# Patient Record
Sex: FEMALE | Race: WHITE | Employment: UNEMPLOYED | URBAN - METROPOLITAN AREA
[De-identification: names, ages, dates, MRNs, and addresses within clinical notes are randomized per-mention and may not be internally consistent; named-entity substitution may affect disease eponyms.]

---

## 2017-06-20 ENCOUNTER — TELEPHONE (OUTPATIENT)
Dept: INTERNAL MEDICINE | Age: 32
End: 2017-06-20

## 2017-06-20 RX ORDER — CIPROFLOXACIN 500 MG/1
500 TABLET, FILM COATED ORAL 2 TIMES DAILY
Qty: 14 TABLET | Refills: 0 | Status: SHIPPED | OUTPATIENT
Start: 2017-06-20 | End: 2018-03-21 | Stop reason: ALTCHOICE

## 2017-06-21 ENCOUNTER — TELEPHONE (OUTPATIENT)
Dept: INTERNAL MEDICINE | Age: 32
End: 2017-06-21

## 2017-06-21 ENCOUNTER — OFFICE VISIT (OUTPATIENT)
Dept: INTERNAL MEDICINE | Age: 32
End: 2017-06-21

## 2017-06-21 VITALS
BODY MASS INDEX: 26.63 KG/M2 | WEIGHT: 156 LBS | TEMPERATURE: 97.6 F | DIASTOLIC BLOOD PRESSURE: 80 MMHG | HEIGHT: 64 IN | SYSTOLIC BLOOD PRESSURE: 110 MMHG

## 2017-06-21 DIAGNOSIS — R19.7 DIARRHEA OF PRESUMED INFECTIOUS ORIGIN: Primary | ICD-10-CM

## 2017-06-21 DIAGNOSIS — R19.7 DIARRHEA OF PRESUMED INFECTIOUS ORIGIN: ICD-10-CM

## 2017-06-21 LAB
REASON FOR REJECTION: NORMAL
REJECTED TEST: NORMAL

## 2017-06-21 PROCEDURE — 99213 OFFICE O/P EST LOW 20 MIN: CPT | Performed by: INTERNAL MEDICINE

## 2017-06-21 RX ORDER — METRONIDAZOLE 500 MG/1
500 TABLET ORAL 3 TIMES DAILY
Qty: 30 TABLET | Refills: 0 | Status: SHIPPED | OUTPATIENT
Start: 2017-06-21 | End: 2017-07-01

## 2017-06-21 RX ORDER — ONDANSETRON 4 MG/1
4 TABLET, FILM COATED ORAL EVERY 8 HOURS PRN
Qty: 30 TABLET | Refills: 2 | Status: SHIPPED | OUTPATIENT
Start: 2017-06-21 | End: 2018-03-21 | Stop reason: ALTCHOICE

## 2017-06-21 ASSESSMENT — ENCOUNTER SYMPTOMS
COUGH: 0
WHEEZING: 0
BLOOD IN STOOL: 0
DIARRHEA: 1
ABDOMINAL PAIN: 1
ABDOMINAL DISTENTION: 1

## 2017-06-25 LAB
FINAL REPORT: NORMAL
PRELIMINARY: NORMAL

## 2017-06-26 LAB
FINAL REPORT: NORMAL
PRELIMINARY: NORMAL

## 2018-03-20 ENCOUNTER — OFFICE VISIT (OUTPATIENT)
Dept: INTERNAL MEDICINE | Age: 33
End: 2018-03-20

## 2018-03-20 VITALS
DIASTOLIC BLOOD PRESSURE: 70 MMHG | WEIGHT: 151 LBS | HEIGHT: 64 IN | BODY MASS INDEX: 25.78 KG/M2 | SYSTOLIC BLOOD PRESSURE: 118 MMHG

## 2018-03-20 DIAGNOSIS — N92.6 MENSTRUAL IRREGULARITY: ICD-10-CM

## 2018-03-20 DIAGNOSIS — F51.01 PRIMARY INSOMNIA: ICD-10-CM

## 2018-03-20 DIAGNOSIS — R10.2 PELVIC PAIN IN FEMALE: ICD-10-CM

## 2018-03-20 DIAGNOSIS — R53.83 FATIGUE, UNSPECIFIED TYPE: ICD-10-CM

## 2018-03-20 DIAGNOSIS — R35.0 URINARY FREQUENCY: Primary | ICD-10-CM

## 2018-03-20 LAB
BILIRUBIN, POC: NORMAL
BLOOD URINE, POC: NORMAL
CLARITY, POC: CLEAR
COLOR, POC: YELLOW
CONTROL: NEGATIVE
GLUCOSE URINE, POC: NORMAL
KETONES, POC: NORMAL
LEUKOCYTE EST, POC: NORMAL
NITRITE, POC: NORMAL
PH, POC: 7
PREGNANCY TEST URINE, POC: NEGATIVE
PROTEIN, POC: NORMAL
SPECIFIC GRAVITY, POC: 1
UROBILINOGEN, POC: 0.2

## 2018-03-20 PROCEDURE — 81002 URINALYSIS NONAUTO W/O SCOPE: CPT | Performed by: INTERNAL MEDICINE

## 2018-03-20 PROCEDURE — 81025 URINE PREGNANCY TEST: CPT | Performed by: INTERNAL MEDICINE

## 2018-03-20 PROCEDURE — 99214 OFFICE O/P EST MOD 30 MIN: CPT | Performed by: INTERNAL MEDICINE

## 2018-03-20 NOTE — PROGRESS NOTES
Subjective:      Patient ID: Aydee Ortega is a 29 y.o. female. 2 weeks ago had urgency and pressure- then had recurrence this past Thursday- had severe dysurea and felt she had to squeeze to get the urine out- keeping her up at night- not on her menses but symptoms did start when she was on it- no fever- some low back pain but no flank pain- worse when lying down- not seen by gyn for awhile and doesn't have one now - feels like she has to use a lot of pressure to get the urine out-no constipation=-no pain w defication- no real pain w urination but painful due to squeeze     Review of Systems   Constitutional: Positive for fatigue. Negative for activity change, appetite change and fever. HENT: Negative for congestion. Eyes: Negative for visual disturbance. Respiratory: Negative for chest tightness and wheezing. Cardiovascular: Negative for chest pain and palpitations. Gastrointestinal: Negative for abdominal pain. Genitourinary: Positive for decreased urine volume, difficulty urinating, dysuria, frequency, pelvic pain and urgency. Negative for flank pain, genital sores and hematuria. Musculoskeletal: Negative for arthralgias and back pain. Skin: Negative for color change and rash. Neurological: Negative for weakness, light-headedness and headaches. Psychiatric/Behavioral: Positive for sleep disturbance. Negative for dysphoric mood. The patient is not nervous/anxious. Objective:   Physical Exam   Constitutional: She is oriented to person, place, and time. She appears well-developed and well-nourished. She is cooperative. No distress. HENT:   Head: Normocephalic and atraumatic. Right Ear: Tympanic membrane, external ear and ear canal normal.   Left Ear: Tympanic membrane, external ear and ear canal normal.   Mouth/Throat: Oropharynx is clear and moist. No oropharyngeal exudate. Eyes: Conjunctivae, EOM and lids are normal. Pupils are equal, round, and reactive to light.  Right eye exhibits no discharge. Left eye exhibits no discharge. No scleral icterus. Neck: Normal range of motion. Neck supple. No JVD present. Carotid bruit is not present. No tracheal deviation and no edema present. No thyroid mass and no thyromegaly present. Cardiovascular: Normal rate, regular rhythm, S1 normal, S2 normal, normal heart sounds and intact distal pulses. Exam reveals no gallop and no friction rub. No murmur heard. No carotid bruit noted bilaterally   Pulmonary/Chest: Effort normal and breath sounds normal. No respiratory distress. She has no wheezes. She has no rhonchi. She has no rales. She exhibits no mass and no tenderness. Right breast exhibits no mass. Left breast exhibits no mass. Breasts are symmetrical.   Abdominal: Soft. Bowel sounds are normal. She exhibits no mass. There is no splenomegaly or hepatomegaly. There is no tenderness. There is no rebound. Genitourinary: Vagina normal and uterus normal. Rectal exam shows no tenderness. No breast swelling, tenderness or discharge. Cervix exhibits no motion tenderness. Right adnexum displays no mass and no tenderness. Left adnexum displays no mass and no tenderness. Genitourinary Comments: No obvious rectocoel or cystocoel  No sig vagial discharge- some tenderness w rotation of cervix  ++ suprapubic tenderness   Musculoskeletal: Normal range of motion. She exhibits no edema or tenderness. Lymphadenopathy:     She has no cervical adenopathy. Right: No inguinal adenopathy present. Left: No inguinal adenopathy present. Neurological: She is alert and oriented to person, place, and time. She has normal strength and normal reflexes. No cranial nerve deficit. Skin: Skin is warm and intact. No rash noted. She is not diaphoretic. Psychiatric: She has a normal mood and affect.  Her speech is normal and behavior is normal. Judgment normal.         Assessment and Plan:      Urinary frequency  No obvious uti but will ck cx-?? Due to

## 2018-03-22 LAB — URINE CULTURE, ROUTINE: NORMAL

## 2018-03-23 LAB
C TRACH DNA GENITAL QL NAA+PROBE: NEGATIVE
N. GONORRHOEAE DNA: NEGATIVE

## 2018-03-27 PROBLEM — R10.2 PELVIC PAIN IN FEMALE: Status: ACTIVE | Noted: 2018-03-27

## 2018-03-27 PROBLEM — F51.01 PRIMARY INSOMNIA: Status: ACTIVE | Noted: 2018-03-27

## 2018-03-27 ASSESSMENT — ENCOUNTER SYMPTOMS
BACK PAIN: 0
CHEST TIGHTNESS: 0
WHEEZING: 0
ABDOMINAL PAIN: 0
COLOR CHANGE: 0

## 2018-04-11 ENCOUNTER — HOSPITAL ENCOUNTER (OUTPATIENT)
Dept: PHYSICAL THERAPY | Age: 33
Discharge: HOME | End: 2018-04-30
Attending: UROLOGY | Admitting: UROLOGY

## 2018-04-11 ASSESSMENT — PAIN DESCRIPTION - PAIN TYPE: TYPE: CHRONIC PAIN

## 2018-04-11 ASSESSMENT — PAIN DESCRIPTION - LOCATION: LOCATION: ABDOMEN;BACK;PELVIS

## 2018-04-11 ASSESSMENT — PAIN SCALES - GENERAL: PAINLEVEL_OUTOF10: 4

## 2018-05-01 ENCOUNTER — HOSPITAL ENCOUNTER (OUTPATIENT)
Dept: OTHER | Age: 33
Discharge: HOME | End: 2018-05-31
Attending: UROLOGY | Admitting: UROLOGY

## 2018-05-08 ENCOUNTER — TELEPHONE (OUTPATIENT)
Dept: INTERNAL MEDICINE | Age: 33
End: 2018-05-08

## 2018-07-11 ENCOUNTER — OFFICE VISIT (OUTPATIENT)
Dept: ORTHOPEDIC SURGERY | Age: 33
End: 2018-07-11

## 2018-07-11 VITALS
HEART RATE: 107 BPM | SYSTOLIC BLOOD PRESSURE: 116 MMHG | DIASTOLIC BLOOD PRESSURE: 83 MMHG | HEIGHT: 64 IN | WEIGHT: 145 LBS | BODY MASS INDEX: 24.75 KG/M2

## 2018-07-11 DIAGNOSIS — M25.551 RIGHT HIP PAIN: Primary | ICD-10-CM

## 2018-07-11 DIAGNOSIS — M25.552 LEFT HIP PAIN: ICD-10-CM

## 2018-07-11 PROCEDURE — 99203 OFFICE O/P NEW LOW 30 MIN: CPT | Performed by: ORTHOPAEDIC SURGERY

## 2018-07-11 RX ORDER — NAPROXEN 250 MG/1
200 TABLET ORAL PRN
COMMUNITY

## 2018-07-11 RX ORDER — GABAPENTIN 100 MG/1
100 CAPSULE ORAL 3 TIMES DAILY
Qty: 90 CAPSULE | Refills: 3 | Status: SHIPPED | OUTPATIENT
Start: 2018-07-11 | End: 2018-08-10

## 2018-07-11 RX ORDER — OXYBUTYNIN CHLORIDE 5 MG/1
10 TABLET ORAL 3 TIMES DAILY
COMMUNITY
End: 2018-10-11 | Stop reason: ALTCHOICE

## 2018-07-11 RX ORDER — ATROPA BELLADONNA AND OPIUM 16.2; 3 MG/1; MG/1
30 SUPPOSITORY RECTAL EVERY 8 HOURS PRN
COMMUNITY

## 2018-07-12 NOTE — PROGRESS NOTES
Patient Name: Alyssa Sarah  : 1985  DOS: 2018        Chief Complaint:   Chief Complaint   Patient presents with   [facility] Patient     bilateral hips more pain on Lt side of the Hip       History of Present Illness:  Alyssa Sarah is a 34 y.o. female who presents with a chief complaint of Pain and numbness in the legs. She's had a complicated history of urinary problems which been evaluated by the urologist without absolute diagnosis. She is continuing to have pain in the perineal area as well as numbness in this region. She is a previous runner which she is discontinued over the last several months. Initially running resulted in bilateral lower extremity numbness. Subsequently symptoms have been intermittent when she's been walking for longer distances. She is just walking the dogs currently with some complaints of pain thigh with radiating discomfort along the upper thigh numbness in the anterolateral thigh and occasional numbness in the dorsal aspect of her foot on the left    She's being evaluated by neurology as well MRI has been negative EMG has shown some mild amplitude variance but no signs of any discrete abnormality  She is not indicating any specific trauma to the groin or left hip or leg. Medical History  Current Medications:   Prior to Admission medications    Medication Sig Start Date End Date Taking? Authorizing Provider   oxybutynin (DITROPAN) 5 MG tablet Take 10 mg by mouth 3 times daily   Yes Historical Provider, MD   naproxen (NAPROSYN) 250 MG tablet Take 200 mg by mouth as needed for Pain   Yes Historical Provider, MD   opium-belladonna (B&O SUPPRETTES) 16.2-30 MG suppository Place 30 mg rectally every 8 hours as needed for Pain. .   Yes Historical Provider, MD   gabapentin (NEURONTIN) 100 MG capsule Take 1 capsule by mouth 3 times daily for 30 days. . 7/11/18 8/10/18 Yes Kasey Duncan MD   Multiple Vitamin (MULTI-VITAMIN DAILY PO) Take  by mouth.    Yes Historical show any obvious abnormalities to the hips    on 2 view of the hip   Procedure(s):       Assessment and Plan:  1. Right hip pain    2. Left hip pain        No Follow-up on file. The orders below, if any, were placed during this visit:   Orders Placed This Encounter   Procedures    XR HIP BILATERAL W AP PELVIS (2 VIEWS)     Order Specific Question:   Reason for exam:     Answer:   Pain RM 4    Ambulatory referral to Physical Therapy     Referral Priority:   Routine     Referral Type:   Eval and Treat     Requested Specialty:   Physical Therapy     Number of Visits Requested:   1       Impression:   [unfilled]    Treatment Plan: Recommendations are to try her on some gabapentin to see if this decreases some of her perineal pain. The possibility of this being pudendal and origin was discussed although etiology is unclear. I was evaluated and grossly she has no signs of obvious disc or root problems. No obvious masses identified. Additional neurology inputs are pending. He does not exhibit any ocular signs nor any intermittent neurologic deficits. No family history of multiple sclerosis. Physical therapy may benefit her maintenance exercise regimen as well as to decrease her IT band pain. We will see her back in another 4-6 weeks to see if therapy and/or medications have made any effect.        Flex Ureña MD

## 2018-07-30 ENCOUNTER — HOSPITAL ENCOUNTER (OUTPATIENT)
Dept: PHYSICAL THERAPY | Age: 33
Setting detail: THERAPIES SERIES
Discharge: HOME | End: 2018-07-30
Payer: COMMERCIAL

## 2018-07-30 PROCEDURE — 97163 PT EVAL HIGH COMPLEX 45 MIN: CPT

## 2018-07-30 PROCEDURE — 97530 THERAPEUTIC ACTIVITIES: CPT

## 2018-07-30 NOTE — FLOWSHEET NOTE
Patient will reduce urinary frequency by 25% to decrease bladder spasm and improve quality of life. 3. Patient will reduce night voids by 50% to improve quality and quantity of sleep. 4. Patient will improve PFDI score by at least 10 points demonstrating improved PF function and quality of life.     Long Term Goals: 8-12 weeks  1. Patient will reduce urinary frequency by 50% to decrease bladder spasm and improve quality of life. 2. Patient will reduce night voids by 75% to improve quality and quantity of sleep. 3. Patient will improve PFDI score by at least 15 points demonstrating improved PF function and quality of life.           Plan: [x] Continue per plan of care [] Alter current plan (see comments)   [] Plan of care initiated [] Hold pending MD visit [] Discharge      Plan for Next Session:  Review diary    Re-Certification Due Date:    8/30/18     Signature:  Donya Mcrae PT, DPT

## 2018-07-30 NOTE — PLAN OF CARE
Patient will reduce night voids by 50% to improve quality and quantity of sleep. 4. Patient will improve PFDI score by at least 10 points demonstrating improved PF function and quality of life. Long Term Goal  1. Patient will reduce urinary frequency by 50% to decrease bladder spasm and improve quality of life. 2. Patient will reduce night voids by 75% to improve quality and quantity of sleep. 3. Patient will improve PFDI score by at least 15 points demonstrating improved PF function and quality of life. Plan  Patient will be seen 1-2x/week for 8-12weeks. Rx to consist of: Home management, NMred, manual, modalities PRN, TA, TE    Time IN/OUT: 8:00am-9:30am      Kevin Jacobs PT, DPT    ______________________________________________________________________    If you have any questions or concerns, please don't hesitate to call.   Thank you for your referral.    Physician Signature:________________________________Date:__________________  By signing above, therapists plan is approved by physician

## 2018-07-31 ENCOUNTER — OFFICE VISIT (OUTPATIENT)
Dept: RHEUMATOLOGY | Age: 33
End: 2018-07-31

## 2018-07-31 VITALS
TEMPERATURE: 99.5 F | HEIGHT: 64 IN | WEIGHT: 151 LBS | DIASTOLIC BLOOD PRESSURE: 68 MMHG | SYSTOLIC BLOOD PRESSURE: 110 MMHG | BODY MASS INDEX: 25.78 KG/M2

## 2018-07-31 DIAGNOSIS — M62.81 MUSCLE WEAKNESS: ICD-10-CM

## 2018-07-31 DIAGNOSIS — Z13.89 SCREENING FOR RHEUMATIC DISORDER: Primary | ICD-10-CM

## 2018-07-31 PROCEDURE — 99244 OFF/OP CNSLTJ NEW/EST MOD 40: CPT | Performed by: INTERNAL MEDICINE

## 2018-07-31 NOTE — PROGRESS NOTES
plan.  I reviewed patient's history, referral documents and electronic medical records including orthopedic note, as well as neurology note from [facility]. Copy of consult note is being routed electronically/faxed to referring physician. #######################################################################    REASON FOR CONSULTATION:  Patient is being seen at the request of  Mal Jackson MD for evaluation of muscle weakness and urological symptoms. HISTORY OF PRESENT ILLNESS:  34 y.o. female states that she has had urinary symptoms since February this year which include frequency, urgency, hesitancy-at times she goes to bathroom 30-40 times a day, wakes up many times at night. Symptoms were worse in February, got better, again became symptomatic since earlier this month. Apart from her urinary symptoms, she reports muscle weakness mainly in her lower extremities, lasts for 20-30 minutes to a few hours, at times associated with transient numbness and tingling, and her symptoms are self limiting without any intervention. She states that her feet feel warm and cold, sometimes red. Denies symptoms that would suggest Raynaud's phenomenon. She has had a number of episodes since February this year. She does not have persistent symptoms. She is asymptomatic today. She was evaluated by neurology-MRI thoracic spine-unremarkable. She also had cystoscopy without any revealing finding. She was evaluated by orthopedics, does not have hip arthritis or SI joint pathology. She is very worried about her symptoms. She is referred here for evaluation of any autoimmune diseases. Pertinent ROS: Denies weight loss, objective fever, skin rashes or skin thickening, photosensitivity Raynauds, focal alopecia, recurrent ocular congestion, dry eyes or mouth, or mucosal ulcers, tinnitus or recent hearing loss. Denies chest pain, palpitations, cough, pleurisy, dysphagia, or features of inflammatory bowel diseases.   No h/o blood clots or bleeding disorders. . No focal weakness or persistent paresthesia. All other ROS are negative. Past Medical History:   Diagnosis Date    History of cardiac cath 2015    SVT (supraventricular tachycardia) (HCC)      Past Surgical History:   Procedure Laterality Date    ABLATION OF DYSRHYTHMIC FOCUS  2013    RHINOPLASTY      WISDOM TOOTH EXTRACTION       No family history of autoimmune diseases  I now  Current Outpatient Prescriptions   Medication Sig Dispense Refill    oxybutynin (DITROPAN) 5 MG tablet Take 10 mg by mouth 3 times daily      naproxen (NAPROSYN) 250 MG tablet Take 200 mg by mouth as needed for Pain      opium-belladonna (B&O SUPPRETTES) 16.2-30 MG suppository Place 30 mg rectally every 8 hours as needed for Pain. Dinh Seamen gabapentin (NEURONTIN) 100 MG capsule Take 1 capsule by mouth 3 times daily for 30 days. . 90 capsule 3    Multiple Vitamin (MULTI-VITAMIN DAILY PO) Take  by mouth.  fluticasone (FLONASE) 50 MCG/ACT SUSP 1 spray by Nasal route daily 1 Bottle 0     No current facility-administered medications for this visit. No Known Allergies    PHYSICAL EXAM:    Vitals:    /68   Temp 99.5 °F (37.5 °C) (Oral)   Ht 5' 4\" (1.626 m)   Wt 151 lb (68.5 kg)   BMI 25.92 kg/m²   General appearance/ Psychiatric: well nourished, and well groomed, normal judgement, alert, appears stated age and cooperative. MKS: Completely unremarkable musculoskeletal examination in upper, lower extremities as well as spine. Normal gait, muscle strength in upper and lower extremities. She is able to squat, stand up without assistance or concerns. Muscle strength is preserved in upper and lower extremities, proximally and distally bilaterally. Skin: No rashes, no induration or skin thickening or nodules. No evidence ischemia or deformities noted in digits or nails. HEENT: Normal lids, lacrimal glands and pupils. No oral or nasal ulcers.  Salivary glands reveal no evidence of abnormality. External inspection of the ears and nose within normal limits. Neck: No masses or asymmetry. No thyroid enlargement. Chest: Normal effort, clear to auscultation. Heart:  Normal s1/s2, no leg edema. Abdomen: soft, non-tender. Liver no palpable. Neurologic: normal deep tendon reflexes. No foot or wrist drop. DATA:   Lab Results   Component Value Date    WBC 7.1 03/21/2018    HGB 14.1 03/21/2018    HCT 41.1 03/21/2018    MCV 92.1 03/21/2018     03/21/2018         Chemistry        Component Value Date/Time     03/21/2018 0253    K 4.2 03/21/2018 0253     03/21/2018 0253    CO2 27 03/21/2018 0253    BUN 11 03/21/2018 0253    CREATININE 0.7 03/21/2018 0253        Component Value Date/Time    CALCIUM 9.3 03/21/2018 0253    ALKPHOS 44 03/21/2018 0253    AST 13 (L) 03/21/2018 0253    ALT 10 03/21/2018 0253    BILITOT 0.3 03/21/2018 0253           Lab Results   Component Value Date    SEDRATE 7 07/25/2018     Lab Results   Component Value Date    CRP <0.3 07/25/2018     Lab Results   Component Value Date    MARK Negative 01/04/2014    SEDRATE 7 07/25/2018     Lab Results   Component Value Date    CKTOTAL 105 05/27/2016     Lab Results   Component Value Date    TSH 2.17 03/05/2016          A/P- See above.

## 2018-08-09 NOTE — PROGRESS NOTES
Physical Therapy    [facility] [place] - No Co Pay. No pre cert. 40 visits per calendar year. 39 visits remaining to date.

## 2018-08-14 ENCOUNTER — HOSPITAL ENCOUNTER (OUTPATIENT)
Dept: PHYSICAL THERAPY | Age: 33
Setting detail: THERAPIES SERIES
Discharge: HOME | End: 2018-08-14
Payer: COMMERCIAL

## 2018-08-14 ENCOUNTER — APPOINTMENT (OUTPATIENT)
Dept: PHYSICAL THERAPY | Age: 33
End: 2018-08-14
Payer: COMMERCIAL

## 2018-08-14 PROCEDURE — 97530 THERAPEUTIC ACTIVITIES: CPT

## 2018-08-16 ENCOUNTER — HOSPITAL ENCOUNTER (OUTPATIENT)
Dept: PHYSICAL THERAPY | Age: 33
Setting detail: THERAPIES SERIES
Discharge: HOME | End: 2018-08-16
Payer: COMMERCIAL

## 2018-08-16 PROCEDURE — 97530 THERAPEUTIC ACTIVITIES: CPT

## 2018-08-16 NOTE — FLOWSHEET NOTE
desire: 378mL   Max: 439ml   PVR: 425mL            ASSESSMENT:   The patient continues with frequent voids and noturia that is pretty severe. Significant amount of time spent analyzing urodynamic test results. The patient's treatment over the next 2 weeks will focus on \"neurogenic bladder\" symptoms and techniques in effort to increase void volume and reduce number of voids per day and night. The patient demonstrated good understanding of all material covered. No pain at end of PT session. Patient did not use bathroom during today's appointment time. Treatment/Activity Tolerance:   [x] Patient tolerated treatment well [] Patient limited by fatique  [] Patient limited by pain [] Patient limited by other medical complications  [] Other:     Goals:    Time Frame: 8-12 weeks. Rehab Potential: good     Short Term Goals: 4-8 weeks  1. Patient will demonstrate independence with HEP to self-manage symptoms. 2. Patient will reduce urinary frequency by 25% to decrease bladder spasm and improve quality of life. 3. Patient will reduce night voids by 50% to improve quality and quantity of sleep. 4. Patient will improve PFDI score by at least 10 points demonstrating improved PF function and quality of life.     Long Term Goals: 8-12 weeks  1. Patient will reduce urinary frequency by 50% to decrease bladder spasm and improve quality of life. 2. Patient will reduce night voids by 75% to improve quality and quantity of sleep. 3. Patient will improve PFDI score by at least 15 points demonstrating improved PF function and quality of life.           Plan: [x] Continue per plan of care [] Alter current plan (see comments)   [] Plan of care initiated [] Hold pending MD visit [] Discharge      Plan for Next Session:  Review diary    Re-Certification Due Date:    8/30/18     Signature:  Medhat Robison PT, DPT

## 2018-08-21 ENCOUNTER — HOSPITAL ENCOUNTER (OUTPATIENT)
Dept: PHYSICAL THERAPY | Age: 33
Setting detail: THERAPIES SERIES
Discharge: HOME | End: 2018-08-21
Payer: COMMERCIAL

## 2018-08-21 PROCEDURE — G0283 ELEC STIM OTHER THAN WOUND: HCPCS

## 2018-08-21 PROCEDURE — 97530 THERAPEUTIC ACTIVITIES: CPT

## 2018-08-21 NOTE — FLOWSHEET NOTE
Physical Therapy Daily Treatment Note    Date:  2018    Patient Name:  Trent Pearl    :  1985  MRN: [id number]  Restrictions/Precautions: universal    Medical/Treatment Diagnosis Information:  · Diagnosis: pelvic floor dysfunciton  Insurance/Certification information:  PT Insurance Information: [employer] [place]  Physician Information:  Referring Practitioner: Nicky Her MD   Plan of care signed (Y/N): N  Visit# / total visits:       G-Code (if applicable):          PFDI: 116.7/300    Medicare Cap (if applicable):  N/a = total amount used, updated 2018    Time in:   8:38a      Timed Treatment: 55min Total Treatment Time:  55min.  ________________________________________________________________________________________    Pain Level:    0/10  SUBJECTIVE:  Reports \"I have been doing better about distracting myself and not going to the bathroom, but night still is a problem\". \"I think for me I am doing better, I am still going in the low 20s per day\". \"as soon as I lay down to go to sleep it is like there is alarm bells going off telling me to pee\". \"during the day it isn't terrible, but at night it is bad, I can't delay\". \"I still am waking up from sleep to have to pee\". \"I am not having any bladder pain or anything\". States she has been using the bladder facilitation techniques, \"I mean it does help to relax a little bit more\". Reports nothing new to report.      OBJECTIVE:     Exercise (TE) Resistance/Repetitions Other comments                          PF relaxation                                   Other Treatment:   TA:  Bladder re-training/education: 45min    Bladder Diary: ~20 (was 9-38) day voids, 8 (was 6-26) night voids, 2-7 leaks per day upon standing from toilet    Bladder Re-training: REVIEW Patient advised to utilize distraction to lengthen time between voids and to perform PF contraction following voids in effort to reduce post-void leakage      Dietary: REVIEW: \"4Cs\" to life.     Long Term Goals: 8-12 weeks  1. Patient will reduce urinary frequency by 50% to decrease bladder spasm and improve quality of life. 2. Patient will reduce night voids by 75% to improve quality and quantity of sleep. 3. Patient will improve PFDI score by at least 15 points demonstrating improved PF function and quality of life.           Plan: [x] Continue per plan of care [] Alter current plan (see comments)   [] Plan of care initiated [] Hold pending MD visit [] Discharge      Plan for Next Session:  Review diary    Re-Certification Due Date:    8/30/18     Signature:  Tennie Epley, PT, DPT

## 2018-08-23 ENCOUNTER — HOSPITAL ENCOUNTER (OUTPATIENT)
Dept: PHYSICAL THERAPY | Age: 33
Setting detail: THERAPIES SERIES
Discharge: HOME | End: 2018-08-23
Payer: COMMERCIAL

## 2018-08-23 PROCEDURE — G0283 ELEC STIM OTHER THAN WOUND: HCPCS

## 2018-08-23 PROCEDURE — 97140 MANUAL THERAPY 1/> REGIONS: CPT

## 2018-08-23 PROCEDURE — 97530 THERAPEUTIC ACTIVITIES: CPT

## 2018-08-23 NOTE — FLOWSHEET NOTE
Physical Therapy Daily Treatment Note    Date:  2018    Patient Name:  Marla Shanks    :  1985  MRN: [id number]  Restrictions/Precautions: universal    Medical/Treatment Diagnosis Information:  · Diagnosis: pelvic floor dysfunciton  Insurance/Certification information:  PT Insurance Information: Med [place]  Physician Information:  Referring Practitioner: David Michelle MD   Plan of care signed (Y/N): N  Visit# / total visits:       G-Code (if applicable):          PFDI: 116.7/300    Medicare Cap (if applicable):  N/a = total amount used, updated 2018    Time in:   10:00a      Timed Treatment: 60min Total Treatment Time:  60min.  ________________________________________________________________________________________    Pain Level:    0/10  SUBJECTIVE:  Reports \"I actually only got up twice last night to pee which is amazing\". Reports she is not having any bladder pain, just \"urethral squeezing randomly throughout the day, but it hasn't been as bad\". Reports \"I loved the estim, it was very relaxing and helped\". OBJECTIVE:     Exercise (TE) Resistance/Repetitions Other comments                          PF relaxation                                   Other Treatment:   TA:  Bladder re-training/education: 30min    Bladder Diary: ~10 (was 9-38) day voids, 8 (was 6-26) night voids, 2-7 leaks per day upon standing from toilet    Bladder Re-training: REVIEW Patient advised to utilize distraction to lengthen time between voids and to perform PF contraction following voids in effort to reduce post-void   leakage      Dietary: REVIEW: \"4Cs\" to reduce bladder irritation and leakage. IC/PBS diet reviewed. Other: Neurogenic bladder facilitation techniques were reviewed. Patient verbalized and demonstrated good understanding. Patient referred to Dr[facility], waiting approval   from insurance. Patient will purchase a home TENS unit and will not use until instructed by PT.     Manual Treatments: STM, trigger point release around urethra in effort to reduce restrictions and c/o tightness x15min        Modalities:  ESTIM (Pre-mod 80-150mHz) to (R) S3 x15min in side-lying to normalize voiding reflex    Test/Measurements:  --           ASSESSMENT:   The patient is slowly making progress, continues to void a more appropriate number throughout the day, continues with night voids that seem to slowly be improving. Continued estim this date in effort to normalize voiding reflex; patient responded well with reports of relaxed pelvic floor. The patient encouraged to continue with facilitory techniques throughout day and relaxation techniques at bed. The patient demonstrated good understanding of all material covered. No pain at end of PT session. Patient did not use bathroom during today's appointment time. Treatment/Activity Tolerance:   [x] Patient tolerated treatment well [] Patient limited by fatique  [] Patient limited by pain [] Patient limited by other medical complications  [] Other:     Goals:    Time Frame: 8-12 weeks. Rehab Potential: good     Short Term Goals: 4-8 weeks  1. Patient will demonstrate independence with HEP to self-manage symptoms. 2. Patient will reduce urinary frequency by 25% to decrease bladder spasm and improve quality of life. 3. Patient will reduce night voids by 50% to improve quality and quantity of sleep. 4. Patient will improve PFDI score by at least 10 points demonstrating improved PF function and quality of life.     Long Term Goals: 8-12 weeks  1. Patient will reduce urinary frequency by 50% to decrease bladder spasm and improve quality of life. 2. Patient will reduce night voids by 75% to improve quality and quantity of sleep. 3. Patient will improve PFDI score by at least 15 points demonstrating improved PF function and quality of life.           Plan: [x] Continue per plan of care [] Alter current plan (see comments)   [] Plan of care initiated [] Hold pending MD visit [] Discharge      Plan for Next Session:  Review diary    Re-Certification Due Date:    8/30/18     Signature:  Evaristo Juarez PT, DPT

## 2018-08-28 ENCOUNTER — HOSPITAL ENCOUNTER (OUTPATIENT)
Dept: PHYSICAL THERAPY | Age: 33
Setting detail: THERAPIES SERIES
Discharge: HOME | End: 2018-08-28
Payer: COMMERCIAL

## 2018-08-28 PROCEDURE — G0283 ELEC STIM OTHER THAN WOUND: HCPCS

## 2018-08-28 PROCEDURE — 97530 THERAPEUTIC ACTIVITIES: CPT

## 2018-08-28 NOTE — FLOWSHEET NOTE
Physical Therapy Daily Treatment Note    Date:  2018    Patient Name:  Suzanne Resendiz    :  1985  MRN: [id number]  Restrictions/Precautions: universal    Medical/Treatment Diagnosis Information:  · Diagnosis: pelvic floor dysfunciton  Insurance/Certification information:  PT Insurance Information: [employer] [place]  Physician Information:  Referring Practitioner: Shantel Varner MD   Plan of care signed (Y/N): N  Visit# / total visits:       G-Code (if applicable):          PFDI: 116.7/300    Medicare Cap (if applicable):  N/a = total amount used, updated 2018    Time in:   11:00a      Timed Treatment: 55min Total Treatment Time:  55min.  ________________________________________________________________________________________    Pain Level:    0/10  SUBJECTIVE:  Reports \"well I had something weird happen, on Friday I felt really weak and on Saturday I had a bad migraine, but then started  night my pee problems started getting bad again\". \" night and Monday night I woke up 5 times in the middle of the night to pee, but it wasn't very much pee each time\". Reports she has stopped taking the oxybutynin per MD orders. Reports her urethral \"squeezing\" is better, reports it has been better since last visit. States she also went out to dinner and had some seasonings on her food that she feels may have caused her flare up. OBJECTIVE:     Exercise (TE) Resistance/Repetitions Other comments                          PF relaxation                                   Other Treatment:   TA:  Bladder re-training/education: x40min    Bladder Diary: ~10 (was 9-38) day voids, 5 (was 6-26) night voids, 1-3 (was 2-7) leaks per day upon standing from toilet    Bladder Re-training: REVIEW Patient advised to utilize distraction to lengthen time between voids and to perform PF contraction following voids in effort to reduce   post-void leakage.       Dietary: REVIEW: \"4Cs\" to reduce bladder quality of life. 2. Patient will reduce night voids by 75% to improve quality and quantity of sleep. 3. Patient will improve PFDI score by at least 15 points demonstrating improved PF function and quality of life.           Plan: [x] Continue per plan of care [] Alter current plan (see comments)   [] Plan of care initiated [] Hold pending MD visit [] Discharge      Plan for Next Session:  Review diary    Re-Certification Due Date:    8/30/18     Signature:  Ovidio Campo PT, DPT

## 2018-08-30 ENCOUNTER — HOSPITAL ENCOUNTER (OUTPATIENT)
Dept: PHYSICAL THERAPY | Age: 33
Setting detail: THERAPIES SERIES
Discharge: HOME | End: 2018-08-30
Payer: COMMERCIAL

## 2018-08-30 PROCEDURE — 97530 THERAPEUTIC ACTIVITIES: CPT

## 2018-08-30 PROCEDURE — G0283 ELEC STIM OTHER THAN WOUND: HCPCS

## 2018-08-30 NOTE — FLOWSHEET NOTE
Physical Therapy Daily Treatment Note    Date:  2018    Patient Name:  Ria Phelps    :  1985  MRN: [id number]  Restrictions/Precautions: universal    Medical/Treatment Diagnosis Information:  · Diagnosis: pelvic floor dysfunciton  Insurance/Certification information:  PT Insurance Information: Med [place]  Physician Information:  Referring Practitioner: Es Vidal MD   Plan of care signed (Y/N): N  Visit# / total visits:       G-Code (if applicable):          PFDI: 116.7/300    Medicare Cap (if applicable):  N/a = total amount used, updated 2018    Time in:   10:00a      Timed Treatment: 55min Total Treatment Time:  55min.  ________________________________________________________________________________________    Pain Level:    0/10  SUBJECTIVE:  Reports \"I am definitely doing better during the day\". \"I am still getting up 4 times per night\". \"I am not having pain or anything just still peeing a lot at night\". Reports her TENS unit isn't in yet, though it should be arriving today. Reports compliance with HEP. OBJECTIVE:     Exercise (TE) Resistance/Repetitions Other comments                          PF relaxation                                   Other Treatment:   TA:  Bladder re-training/education: x40min    Bladder Diary: ~10 (was 9-38) day voids, 4 (was 6-26) night voids, 1-3 (was 2-7) leaks per day upon standing from toilet    Bladder Re-training: REVIEW Patient advised to utilize distraction to lengthen time between voids and to perform PF contraction following voids in effort to reduce   post-void leakage. Dietary: REVIEW: \"4Cs\" to reduce bladder irritation and leakage. IC/PBS diet reviewed. Advised to trial Prelief when eating out; though to continue to follow bladder diet closely. Other: Neurogenic bladder facilitation techniques were reviewed. Patient verbalized and demonstrated good understanding.   Patient has purchased a home TENS unit and Alter current plan (see comments)   [] Plan of care initiated [] Hold pending MD visit [] Discharge      Plan for Next Session:  Review diary    Re-Certification Due Date:    8/30/18     Signature:  Denice Mccurdy PT, DPT

## 2018-09-04 ENCOUNTER — APPOINTMENT (OUTPATIENT)
Dept: PHYSICAL THERAPY | Age: 33
End: 2018-09-04
Payer: COMMERCIAL

## 2018-09-06 ENCOUNTER — HOSPITAL ENCOUNTER (OUTPATIENT)
Dept: PHYSICAL THERAPY | Age: 33
Setting detail: THERAPIES SERIES
Discharge: HOME | End: 2018-09-06
Payer: COMMERCIAL

## 2018-09-06 PROCEDURE — 97110 THERAPEUTIC EXERCISES: CPT

## 2018-09-06 PROCEDURE — G0283 ELEC STIM OTHER THAN WOUND: HCPCS

## 2018-09-06 NOTE — FLOWSHEET NOTE
to assess bladder diary and progress. Treatment/Activity Tolerance:   [x] Patient tolerated treatment well [] Patient limited by fatique  [] Patient limited by pain [] Patient limited by other medical complications  [] Other:     Goals:    Time Frame: 8-12 weeks. Rehab Potential: good     Short Term Goals: 4-8 weeks  1. Patient will demonstrate independence with HEP to self-manage symptoms. 2. Patient will reduce urinary frequency by 25% to decrease bladder spasm and improve quality of life. 3. Patient will reduce night voids by 50% to improve quality and quantity of sleep. 4. Patient will improve PFDI score by at least 10 points demonstrating improved PF function and quality of life.     Long Term Goals: 8-12 weeks  1. Patient will reduce urinary frequency by 50% to decrease bladder spasm and improve quality of life. 2. Patient will reduce night voids by 75% to improve quality and quantity of sleep. 3. Patient will improve PFDI score by at least 15 points demonstrating improved PF function and quality of life.           Plan: [x] Continue per plan of care [] Alter current plan (see comments)   [] Plan of care initiated [] Hold pending MD visit [] Discharge      Plan for Next Session:  Review diary    Re-Certification Due Date:    8/30/18     Signature:  Bean Quinones, PT, DPT

## 2018-09-13 ENCOUNTER — TELEPHONE (OUTPATIENT)
Dept: CARDIOLOGY CLINIC | Age: 33
End: 2018-09-13

## 2018-09-13 NOTE — TELEPHONE ENCOUNTER
Spoke with patient's  to verify why patient was referred to Dr. Trevon Chun to order autonomic testing. Patient's  states that a tilt table test , heart and BP test need to be ordered by Cardiology as wellas QSART test. Patient hasn't been seen in our office at all. I tried to contact  again but could not leave a message since mailbox is full.

## 2018-09-14 ENCOUNTER — APPOINTMENT (OUTPATIENT)
Dept: PHYSICAL THERAPY | Age: 33
End: 2018-09-14
Payer: COMMERCIAL

## 2018-09-20 ENCOUNTER — HOSPITAL ENCOUNTER (OUTPATIENT)
Dept: PHYSICAL THERAPY | Age: 33
Setting detail: THERAPIES SERIES
End: 2018-09-20
Payer: COMMERCIAL

## 2018-09-26 ENCOUNTER — HOSPITAL ENCOUNTER (OUTPATIENT)
Dept: PHYSICAL THERAPY | Age: 33
Setting detail: THERAPIES SERIES
End: 2018-09-26
Payer: COMMERCIAL

## 2018-10-11 ENCOUNTER — OFFICE VISIT (OUTPATIENT)
Dept: CARDIOLOGY CLINIC | Age: 33
End: 2018-10-11
Payer: COMMERCIAL

## 2018-10-11 VITALS
BODY MASS INDEX: 25.95 KG/M2 | SYSTOLIC BLOOD PRESSURE: 102 MMHG | HEIGHT: 64 IN | HEART RATE: 92 BPM | OXYGEN SATURATION: 98 % | WEIGHT: 152 LBS | DIASTOLIC BLOOD PRESSURE: 68 MMHG

## 2018-10-11 DIAGNOSIS — I48.92 ATRIAL FLUTTER WITH RAPID VENTRICULAR RESPONSE (HCC): Primary | ICD-10-CM

## 2018-10-11 PROCEDURE — 93000 ELECTROCARDIOGRAM COMPLETE: CPT | Performed by: INTERNAL MEDICINE

## 2018-11-16 ENCOUNTER — TELEPHONE (OUTPATIENT)
Dept: CARDIOLOGY CLINIC | Age: 33
End: 2018-11-16

## 2018-11-16 NOTE — TELEPHONE ENCOUNTER
Received return call from [facility] in non-invasive testing stating that [facility] does not perform QSART testing. Left Bernie Jimenez a message to return call to the office.

## 2018-11-16 NOTE — TELEPHONE ENCOUNTER
Spoke with non-invasive cardiac testing to see if QSART testing is completed at [facility].  It is quantitative Sudomotor Axon Reflex Test, is a test that measures the autonomic nerves that control sweating and Dr. Livan Graham cancelled the visit on 10/11 since the testing was not offered. Non-invasive testing was unsure if the testing could be completed at [facility]. She states she will return call to let us know to help further assist the patient.

## 2019-12-16 ENCOUNTER — OFFICE VISIT (OUTPATIENT)
Dept: INTERNAL MEDICINE CLINIC | Age: 34
End: 2019-12-16
Payer: COMMERCIAL

## 2019-12-16 VITALS
DIASTOLIC BLOOD PRESSURE: 66 MMHG | SYSTOLIC BLOOD PRESSURE: 102 MMHG | BODY MASS INDEX: 26.87 KG/M2 | WEIGHT: 146 LBS | HEIGHT: 62 IN

## 2019-12-16 DIAGNOSIS — I48.92 ATRIAL FLUTTER WITH RAPID VENTRICULAR RESPONSE (HCC): ICD-10-CM

## 2019-12-16 DIAGNOSIS — Z00.00 WELL ADULT EXAM: Primary | ICD-10-CM

## 2019-12-16 DIAGNOSIS — R25.3 MUSCLE TWITCHING: ICD-10-CM

## 2019-12-16 DIAGNOSIS — R73.9 HYPERGLYCEMIA: ICD-10-CM

## 2019-12-16 DIAGNOSIS — F51.01 PRIMARY INSOMNIA: ICD-10-CM

## 2019-12-16 DIAGNOSIS — R35.0 URINARY FREQUENCY: ICD-10-CM

## 2019-12-16 DIAGNOSIS — R20.2 PARESTHESIAS: ICD-10-CM

## 2019-12-16 DIAGNOSIS — G72.9 MYOPATHY: ICD-10-CM

## 2019-12-16 DIAGNOSIS — Z11.4 ENCOUNTER FOR SCREENING FOR HIV: ICD-10-CM

## 2019-12-16 PROBLEM — R19.7 DIARRHEA OF PRESUMED INFECTIOUS ORIGIN: Status: RESOLVED | Noted: 2017-06-21 | Resolved: 2019-12-16

## 2019-12-16 PROCEDURE — 99395 PREV VISIT EST AGE 18-39: CPT | Performed by: INTERNAL MEDICINE

## 2019-12-16 SDOH — ECONOMIC STABILITY: TRANSPORTATION INSECURITY
IN THE PAST 12 MONTHS, HAS LACK OF TRANSPORTATION KEPT YOU FROM MEETINGS, WORK, OR FROM GETTING THINGS NEEDED FOR DAILY LIVING?: PATIENT DECLINED

## 2019-12-16 SDOH — ECONOMIC STABILITY: FOOD INSECURITY: WITHIN THE PAST 12 MONTHS, THE FOOD YOU BOUGHT JUST DIDN'T LAST AND YOU DIDN'T HAVE MONEY TO GET MORE.: PATIENT DECLINED

## 2019-12-16 SDOH — ECONOMIC STABILITY: FOOD INSECURITY: WITHIN THE PAST 12 MONTHS, YOU WORRIED THAT YOUR FOOD WOULD RUN OUT BEFORE YOU GOT MONEY TO BUY MORE.: PATIENT DECLINED

## 2019-12-16 SDOH — ECONOMIC STABILITY: TRANSPORTATION INSECURITY
IN THE PAST 12 MONTHS, HAS THE LACK OF TRANSPORTATION KEPT YOU FROM MEDICAL APPOINTMENTS OR FROM GETTING MEDICATIONS?: PATIENT DECLINED

## 2019-12-16 ASSESSMENT — PATIENT HEALTH QUESTIONNAIRE - PHQ9
SUM OF ALL RESPONSES TO PHQ QUESTIONS 1-9: 0
SUM OF ALL RESPONSES TO PHQ9 QUESTIONS 1 & 2: 0
1. LITTLE INTEREST OR PLEASURE IN DOING THINGS: 0
SUM OF ALL RESPONSES TO PHQ QUESTIONS 1-9: 0
2. FEELING DOWN, DEPRESSED OR HOPELESS: 0

## 2019-12-16 ASSESSMENT — ENCOUNTER SYMPTOMS
ABDOMINAL PAIN: 0
WHEEZING: 0
CHEST TIGHTNESS: 0
COLOR CHANGE: 0
BACK PAIN: 0

## 2019-12-17 DIAGNOSIS — F51.01 PRIMARY INSOMNIA: ICD-10-CM

## 2019-12-17 DIAGNOSIS — R73.9 HYPERGLYCEMIA: ICD-10-CM

## 2019-12-17 DIAGNOSIS — R35.0 URINARY FREQUENCY: ICD-10-CM

## 2019-12-17 DIAGNOSIS — Z11.4 ENCOUNTER FOR SCREENING FOR HIV: ICD-10-CM

## 2019-12-17 DIAGNOSIS — Z00.00 WELL ADULT EXAM: ICD-10-CM

## 2019-12-17 DIAGNOSIS — R25.3 MUSCLE TWITCHING: ICD-10-CM

## 2019-12-17 DIAGNOSIS — G72.9 MYOPATHY: ICD-10-CM

## 2019-12-17 DIAGNOSIS — I48.92 ATRIAL FLUTTER WITH RAPID VENTRICULAR RESPONSE (HCC): ICD-10-CM

## 2019-12-17 LAB
A/G RATIO: 2 (ref 1.1–2.2)
ALBUMIN SERPL-MCNC: 4.5 G/DL (ref 3.4–5)
ALP BLD-CCNC: 35 U/L (ref 40–129)
ALT SERPL-CCNC: 34 U/L (ref 10–40)
ANION GAP SERPL CALCULATED.3IONS-SCNC: 15 MMOL/L (ref 3–16)
AST SERPL-CCNC: 29 U/L (ref 15–37)
BASOPHILS ABSOLUTE: 0 K/UL (ref 0–0.2)
BASOPHILS RELATIVE PERCENT: 0.5 %
BILIRUB SERPL-MCNC: 0.6 MG/DL (ref 0–1)
BUN BLDV-MCNC: 12 MG/DL (ref 7–20)
CALCIUM SERPL-MCNC: 9.4 MG/DL (ref 8.3–10.6)
CHLORIDE BLD-SCNC: 98 MMOL/L (ref 99–110)
CHOLESTEROL, TOTAL: 136 MG/DL (ref 0–199)
CO2: 22 MMOL/L (ref 21–32)
CREAT SERPL-MCNC: 0.7 MG/DL (ref 0.6–1.1)
EOSINOPHILS ABSOLUTE: 0 K/UL (ref 0–0.6)
EOSINOPHILS RELATIVE PERCENT: 1 %
GFR AFRICAN AMERICAN: >60
GFR NON-AFRICAN AMERICAN: >60
GLOBULIN: 2.2 G/DL
GLUCOSE BLD-MCNC: 93 MG/DL (ref 70–99)
HCT VFR BLD CALC: 39.6 % (ref 36–48)
HDLC SERPL-MCNC: 58 MG/DL (ref 40–60)
HEMOGLOBIN: 13.3 G/DL (ref 12–16)
LDL CHOLESTEROL CALCULATED: 56 MG/DL
LYMPHOCYTES ABSOLUTE: 2 K/UL (ref 1–5.1)
LYMPHOCYTES RELATIVE PERCENT: 41.9 %
MCH RBC QN AUTO: 31.7 PG (ref 26–34)
MCHC RBC AUTO-ENTMCNC: 33.5 G/DL (ref 31–36)
MCV RBC AUTO: 94.6 FL (ref 80–100)
MONOCYTES ABSOLUTE: 0.4 K/UL (ref 0–1.3)
MONOCYTES RELATIVE PERCENT: 8.8 %
NEUTROPHILS ABSOLUTE: 2.3 K/UL (ref 1.7–7.7)
NEUTROPHILS RELATIVE PERCENT: 47.8 %
PDW BLD-RTO: 13.1 % (ref 12.4–15.4)
PLATELET # BLD: 330 K/UL (ref 135–450)
PMV BLD AUTO: 8.9 FL (ref 5–10.5)
POTASSIUM SERPL-SCNC: 4.4 MMOL/L (ref 3.5–5.1)
RBC # BLD: 4.19 M/UL (ref 4–5.2)
SODIUM BLD-SCNC: 135 MMOL/L (ref 136–145)
TOTAL PROTEIN: 6.7 G/DL (ref 6.4–8.2)
TRIGL SERPL-MCNC: 110 MG/DL (ref 0–150)
TSH SERPL DL<=0.05 MIU/L-ACNC: 3.9 UIU/ML (ref 0.27–4.2)
VLDLC SERPL CALC-MCNC: 22 MG/DL
WBC # BLD: 4.8 K/UL (ref 4–11)

## 2019-12-18 LAB
ESTIMATED AVERAGE GLUCOSE: 82.5 MG/DL
HBA1C MFR BLD: 4.5 %
HIV AG/AB: NORMAL
HIV ANTIGEN: NORMAL
HIV-1 ANTIBODY: NORMAL
HIV-2 AB: NORMAL

## 2021-03-14 ENCOUNTER — HOSPITAL ENCOUNTER (EMERGENCY)
Age: 36
Discharge: HOME | End: 2021-03-14
Attending: EMERGENCY MEDICINE
Payer: COMMERCIAL

## 2021-03-14 ENCOUNTER — HOSPITAL ENCOUNTER (EMERGENCY)
Age: 36
Discharge: LEFT BEFORE DISCHARGE | End: 2021-03-14
Attending: EMERGENCY MEDICINE | Admitting: HOSPITALIST
Payer: COMMERCIAL

## 2021-03-14 ENCOUNTER — APPOINTMENT (OUTPATIENT)
Dept: CT IMAGING | Age: 36
End: 2021-03-14
Payer: COMMERCIAL

## 2021-03-14 VITALS
HEART RATE: 118 BPM | TEMPERATURE: 97.6 F | SYSTOLIC BLOOD PRESSURE: 119 MMHG | DIASTOLIC BLOOD PRESSURE: 83 MMHG | OXYGEN SATURATION: 99 % | RESPIRATION RATE: 16 BRPM | WEIGHT: 146 LBS | BODY MASS INDEX: 26.7 KG/M2

## 2021-03-14 VITALS
RESPIRATION RATE: 17 BRPM | WEIGHT: 146 LBS | BODY MASS INDEX: 26.7 KG/M2 | OXYGEN SATURATION: 97 % | HEART RATE: 117 BPM | TEMPERATURE: 98.6 F | DIASTOLIC BLOOD PRESSURE: 76 MMHG | SYSTOLIC BLOOD PRESSURE: 112 MMHG

## 2021-03-14 DIAGNOSIS — R11.0 NAUSEA: Primary | ICD-10-CM

## 2021-03-14 DIAGNOSIS — R26.2 AMBULATORY DYSFUNCTION: ICD-10-CM

## 2021-03-14 DIAGNOSIS — R19.7 DIARRHEA, UNSPECIFIED TYPE: ICD-10-CM

## 2021-03-14 DIAGNOSIS — R06.89 DYSPNEA AND RESPIRATORY ABNORMALITIES: ICD-10-CM

## 2021-03-14 DIAGNOSIS — R00.0 TACHYCARDIA: ICD-10-CM

## 2021-03-14 DIAGNOSIS — R06.00 DYSPNEA AND RESPIRATORY ABNORMALITIES: ICD-10-CM

## 2021-03-14 PROBLEM — R11.2 N&V (NAUSEA AND VOMITING): Status: ACTIVE | Noted: 2021-03-14

## 2021-03-14 PROBLEM — R29.6 FREQUENT FALLS: Status: ACTIVE | Noted: 2021-03-14

## 2021-03-14 PROBLEM — N31.9 NEUROGENIC BLADDER: Status: ACTIVE | Noted: 2021-03-14

## 2021-03-14 LAB
A/G RATIO: 1.9 (ref 1.1–2.2)
ALBUMIN SERPL-MCNC: 5 G/DL (ref 3.4–5)
ALP BLD-CCNC: 44 U/L (ref 40–129)
ALT SERPL-CCNC: 15 U/L (ref 10–40)
ANION GAP SERPL CALCULATED.3IONS-SCNC: 14 MMOL/L (ref 3–16)
AST SERPL-CCNC: 18 U/L (ref 15–37)
BASOPHILS ABSOLUTE: 0.1 K/UL (ref 0–0.2)
BASOPHILS RELATIVE PERCENT: 0.7 %
BILIRUB SERPL-MCNC: 0.5 MG/DL (ref 0–1)
BUN BLDV-MCNC: 20 MG/DL (ref 7–20)
CALCIUM SERPL-MCNC: 10 MG/DL (ref 8.3–10.6)
CHLORIDE BLD-SCNC: 100 MMOL/L (ref 99–110)
CO2: 23 MMOL/L (ref 21–32)
CREAT SERPL-MCNC: 0.7 MG/DL (ref 0.6–1.1)
D DIMER: 498 NG/ML DDU (ref 0–229)
EKG ATRIAL RATE: 119 BPM
EKG DIAGNOSIS: NORMAL
EKG P AXIS: 62 DEGREES
EKG P-R INTERVAL: 134 MS
EKG Q-T INTERVAL: 324 MS
EKG QRS DURATION: 78 MS
EKG QTC CALCULATION (BAZETT): 455 MS
EKG R AXIS: 81 DEGREES
EKG T AXIS: 39 DEGREES
EKG VENTRICULAR RATE: 119 BPM
EOSINOPHILS ABSOLUTE: 0.1 K/UL (ref 0–0.6)
EOSINOPHILS RELATIVE PERCENT: 0.6 %
GFR AFRICAN AMERICAN: >60
GFR NON-AFRICAN AMERICAN: >60
GLOBULIN: 2.7 G/DL
GLUCOSE BLD-MCNC: 126 MG/DL (ref 70–99)
HCG QUALITATIVE: NEGATIVE
HCT VFR BLD CALC: 41.2 % (ref 36–48)
HEMOGLOBIN: 14.4 G/DL (ref 12–16)
LYMPHOCYTES ABSOLUTE: 0.7 K/UL (ref 1–5.1)
LYMPHOCYTES RELATIVE PERCENT: 7.3 %
MCH RBC QN AUTO: 32.1 PG (ref 26–34)
MCHC RBC AUTO-ENTMCNC: 34.8 G/DL (ref 31–36)
MCV RBC AUTO: 92.1 FL (ref 80–100)
MONOCYTES ABSOLUTE: 0.6 K/UL (ref 0–1.3)
MONOCYTES RELATIVE PERCENT: 5.7 %
NEUTROPHILS ABSOLUTE: 8.5 K/UL (ref 1.7–7.7)
NEUTROPHILS RELATIVE PERCENT: 85.7 %
PDW BLD-RTO: 12.7 % (ref 12.4–15.4)
PLATELET # BLD: 328 K/UL (ref 135–450)
PMV BLD AUTO: 8.1 FL (ref 5–10.5)
POTASSIUM SERPL-SCNC: 3.5 MMOL/L (ref 3.5–5.1)
RBC # BLD: 4.48 M/UL (ref 4–5.2)
SODIUM BLD-SCNC: 137 MMOL/L (ref 136–145)
TOTAL PROTEIN: 7.7 G/DL (ref 6.4–8.2)
WBC # BLD: 9.9 K/UL (ref 4–11)

## 2021-03-14 PROCEDURE — 6360000002 HC RX W HCPCS: Performed by: EMERGENCY MEDICINE

## 2021-03-14 PROCEDURE — 2580000003 HC RX 258: Performed by: EMERGENCY MEDICINE

## 2021-03-14 PROCEDURE — 84703 CHORIONIC GONADOTROPIN ASSAY: CPT

## 2021-03-14 PROCEDURE — 96372 THER/PROPH/DIAG INJ SC/IM: CPT

## 2021-03-14 PROCEDURE — 6360000004 HC RX CONTRAST MEDICATION: Performed by: EMERGENCY MEDICINE

## 2021-03-14 PROCEDURE — 80053 COMPREHEN METABOLIC PANEL: CPT

## 2021-03-14 PROCEDURE — 71260 CT THORAX DX C+: CPT

## 2021-03-14 PROCEDURE — 93010 ELECTROCARDIOGRAM REPORT: CPT | Performed by: INTERNAL MEDICINE

## 2021-03-14 PROCEDURE — 96374 THER/PROPH/DIAG INJ IV PUSH: CPT

## 2021-03-14 PROCEDURE — 99283 EMERGENCY DEPT VISIT LOW MDM: CPT

## 2021-03-14 PROCEDURE — 93005 ELECTROCARDIOGRAM TRACING: CPT | Performed by: EMERGENCY MEDICINE

## 2021-03-14 PROCEDURE — 36415 COLL VENOUS BLD VENIPUNCTURE: CPT

## 2021-03-14 PROCEDURE — 85025 COMPLETE CBC W/AUTO DIFF WBC: CPT

## 2021-03-14 PROCEDURE — 99285 EMERGENCY DEPT VISIT HI MDM: CPT

## 2021-03-14 PROCEDURE — 83036 HEMOGLOBIN GLYCOSYLATED A1C: CPT

## 2021-03-14 PROCEDURE — 85379 FIBRIN DEGRADATION QUANT: CPT

## 2021-03-14 RX ORDER — 0.9 % SODIUM CHLORIDE 0.9 %
1000 INTRAVENOUS SOLUTION INTRAVENOUS ONCE
Status: COMPLETED | OUTPATIENT
Start: 2021-03-14 | End: 2021-03-14

## 2021-03-14 RX ORDER — SENNA PLUS 8.6 MG/1
1 TABLET ORAL DAILY PRN
Status: CANCELLED | OUTPATIENT
Start: 2021-03-14

## 2021-03-14 RX ORDER — MAGNESIUM SULFATE IN WATER 40 MG/ML
2000 INJECTION, SOLUTION INTRAVENOUS PRN
Status: CANCELLED | OUTPATIENT
Start: 2021-03-14

## 2021-03-14 RX ORDER — ACETAMINOPHEN 650 MG/1
650 SUPPOSITORY RECTAL EVERY 6 HOURS PRN
Status: CANCELLED | OUTPATIENT
Start: 2021-03-14

## 2021-03-14 RX ORDER — ACETAMINOPHEN 325 MG/1
650 TABLET ORAL EVERY 6 HOURS PRN
Status: CANCELLED | OUTPATIENT
Start: 2021-03-14

## 2021-03-14 RX ORDER — ONDANSETRON 4 MG/1
4 TABLET, ORALLY DISINTEGRATING ORAL EVERY 8 HOURS PRN
Status: CANCELLED | OUTPATIENT
Start: 2021-03-14

## 2021-03-14 RX ORDER — SODIUM CHLORIDE, SODIUM LACTATE, POTASSIUM CHLORIDE, CALCIUM CHLORIDE 600; 310; 30; 20 MG/100ML; MG/100ML; MG/100ML; MG/100ML
INJECTION, SOLUTION INTRAVENOUS CONTINUOUS
Status: CANCELLED | OUTPATIENT
Start: 2021-03-14 | End: 2021-03-15

## 2021-03-14 RX ORDER — SODIUM CHLORIDE 0.9 % (FLUSH) 0.9 %
10 SYRINGE (ML) INJECTION EVERY 12 HOURS SCHEDULED
Status: CANCELLED | OUTPATIENT
Start: 2021-03-14

## 2021-03-14 RX ORDER — GABAPENTIN 100 MG/1
100 CAPSULE ORAL 3 TIMES DAILY
Status: CANCELLED | OUTPATIENT
Start: 2021-03-14

## 2021-03-14 RX ORDER — POTASSIUM CHLORIDE 20 MEQ/1
40 TABLET, EXTENDED RELEASE ORAL PRN
Status: CANCELLED | OUTPATIENT
Start: 2021-03-14

## 2021-03-14 RX ORDER — ONDANSETRON 4 MG/1
4 TABLET, ORALLY DISINTEGRATING ORAL EVERY 8 HOURS PRN
Qty: 12 TABLET | Refills: 0 | Status: SHIPPED | OUTPATIENT
Start: 2021-03-14

## 2021-03-14 RX ORDER — ONDANSETRON 2 MG/ML
4 INJECTION INTRAMUSCULAR; INTRAVENOUS ONCE
Status: COMPLETED | OUTPATIENT
Start: 2021-03-14 | End: 2021-03-14

## 2021-03-14 RX ORDER — DROPERIDOL 2.5 MG/ML
1.25 INJECTION, SOLUTION INTRAMUSCULAR; INTRAVENOUS ONCE
Status: COMPLETED | OUTPATIENT
Start: 2021-03-14 | End: 2021-03-14

## 2021-03-14 RX ORDER — SODIUM CHLORIDE 0.9 % (FLUSH) 0.9 %
10 SYRINGE (ML) INJECTION PRN
Status: CANCELLED | OUTPATIENT
Start: 2021-03-14

## 2021-03-14 RX ORDER — MULTIVITAMIN
1 TABLET ORAL DAILY
Status: CANCELLED | OUTPATIENT
Start: 2021-03-14

## 2021-03-14 RX ORDER — ONDANSETRON 2 MG/ML
4 INJECTION INTRAMUSCULAR; INTRAVENOUS EVERY 6 HOURS PRN
Status: CANCELLED | OUTPATIENT
Start: 2021-03-14

## 2021-03-14 RX ORDER — FLUTICASONE PROPIONATE 50 MCG
1 SPRAY, SUSPENSION (ML) NASAL DAILY
Status: CANCELLED | OUTPATIENT
Start: 2021-03-14

## 2021-03-14 RX ORDER — POTASSIUM CHLORIDE 7.45 MG/ML
10 INJECTION INTRAVENOUS PRN
Status: CANCELLED | OUTPATIENT
Start: 2021-03-14

## 2021-03-14 RX ADMIN — ONDANSETRON 4 MG: 2 INJECTION INTRAMUSCULAR; INTRAVENOUS at 10:03

## 2021-03-14 RX ADMIN — SODIUM CHLORIDE 1000 ML: 9 INJECTION, SOLUTION INTRAVENOUS at 08:55

## 2021-03-14 RX ADMIN — DROPERIDOL 1.25 MG: 2.5 INJECTION, SOLUTION INTRAMUSCULAR; INTRAVENOUS at 14:29

## 2021-03-14 RX ADMIN — SODIUM CHLORIDE 1000 ML: 9 INJECTION, SOLUTION INTRAVENOUS at 14:29

## 2021-03-14 RX ADMIN — IOPAMIDOL 75 ML: 755 INJECTION, SOLUTION INTRAVENOUS at 10:00

## 2021-03-14 NOTE — ED NOTES
Patient states to RN \"I really don't want to be admitted to the hospital, I am feeling a lot better after the fluids\". Dr. Kenya Zheng aware.      Ariana Zurita, JOANIE  03/14/21 6015

## 2021-03-14 NOTE — ED NOTES
Dr. Amie Corrales at bedside to update patient on results.       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

## 2021-03-14 NOTE — Clinical Note
Patient Class: Observation [104]   REQUIRED: Diagnosis: Proximal limb muscle weakness [515515]   Estimated Length of Stay: Estimated stay of less than 2 midnights   Admitting Provider: Flor Vaughn [[id number]]   Telemetry Bed Required?: Yes

## 2021-03-14 NOTE — ED PROVIDER NOTES
capsule by mouth 3 times daily for 30 days. .    MULTIPLE VITAMIN (MULTI-VITAMIN DAILY PO)    Take  by mouth. NAPROXEN (NAPROSYN) 250 MG TABLET    Take 200 mg by mouth as needed for Pain    OPIUM-BELLADONNA (B&O SUPPRETTES) 16.2-30 MG SUPPOSITORY    Place 30 mg rectally every 8 hours as needed for Pain. .       Social history:  reports that she has never smoked. She has never used smokeless tobacco. She reports current alcohol use. She reports that she does not use drugs. Family history:  History reviewed. No pertinent family history. Exam  ED Triage Vitals [03/14/21 0811]   BP Temp Temp Source Pulse Resp SpO2 Height Weight   (!) 131/90 97.6 °F (36.4 °C) Oral 128 24 99 % -- 146 lb (66.2 kg)     Nursing note and vitals reviewed. Constitutional: In no acute distress  HENT:      Head: Normocephalic      Ears: External ears normal.      Nose: Nose normal.     Mouth: Membrane mucosa moist   Eyes: No discharge. Neck: Supple. Trachea midline. Cardiovascular: Regular rate. Warm extremities  Pulmonary/Chest: Effort normal. No respiratory distress. CTAB. Abdominal: Soft. No distension. Nontender  : Deferred  Rectal: Deferred   Musculoskeletal: Moves all extremities. No gross deformity. Neurological: Alert and oriented. Face symmetric. Speech is clear. Cranial nerves II to XII intact. Normal finger-to-nose and heel-to-shin. Normal strength in bilateral hip, knee, ankle, big toe flexors/extensors. 2+ patellar reflexes bilaterally. Normal sensation over bilateral arms and legs  Skin: Warm and dry. No rash. Psychiatric: Normal mood and affect. Behavior is normal.    Procedures      EKG  The Ekg interpreted by me in the absence of a cardiologist shows.   Sinus tachycardia  Heart rate 119  QTc 455  No ST changes        Radiology  No orders to display       Labs  Results for orders placed or performed during the hospital encounter of 03/14/21   CBC Auto Differential   Result Value Ref Range    WBC 9.9 4.0 - 11.0 K/uL    RBC 4.48 4.00 - 5.20 M/uL    Hemoglobin 14.4 12.0 - 16.0 g/dL    Hematocrit 41.2 36.0 - 48.0 %    MCV 92.1 80.0 - 100.0 fL    MCH 32.1 26.0 - 34.0 pg    MCHC 34.8 31.0 - 36.0 g/dL    RDW 12.7 12.4 - 15.4 %    Platelets 372 793 - 925 K/uL    MPV 8.1 5.0 - 10.5 fL    Neutrophils % 85.7 %    Lymphocytes % 7.3 %    Monocytes % 5.7 %    Eosinophils % 0.6 %    Basophils % 0.7 %    Neutrophils Absolute 8.5 (H) 1.7 - 7.7 K/uL    Lymphocytes Absolute 0.7 (L) 1.0 - 5.1 K/uL    Monocytes Absolute 0.6 0.0 - 1.3 K/uL    Eosinophils Absolute 0.1 0.0 - 0.6 K/uL    Basophils Absolute 0.1 0.0 - 0.2 K/uL   Comprehensive Metabolic Panel   Result Value Ref Range    Sodium 137 136 - 145 mmol/L    Potassium 3.5 3.5 - 5.1 mmol/L    Chloride 100 99 - 110 mmol/L    CO2 23 21 - 32 mmol/L    Anion Gap 14 3 - 16    Glucose 126 (H) 70 - 99 mg/dL    BUN 20 7 - 20 mg/dL    CREATININE 0.7 0.6 - 1.1 mg/dL    GFR Non-African American >60 >60    GFR African American >60 >60    Calcium 10.0 8.3 - 10.6 mg/dL    Total Protein 7.7 6.4 - 8.2 g/dL    Albumin 5.0 3.4 - 5.0 g/dL    Albumin/Globulin Ratio 1.9 1.1 - 2.2    Total Bilirubin 0.5 0.0 - 1.0 mg/dL    Alkaline Phosphatase 44 40 - 129 U/L    ALT 15 10 - 40 U/L    AST 18 15 - 37 U/L    Globulin 2.7 g/dL   HCG Qualitative, Serum   Result Value Ref Range    hCG Qual Negative Detects HCG level >10 MIU/mL   EKG 12 Lead   Result Value Ref Range    Ventricular Rate 119 BPM    Atrial Rate 119 BPM    P-R Interval 134 ms    QRS Duration 78 ms    Q-T Interval 324 ms    QTc Calculation (Bazett) 455 ms    P Axis 62 degrees    R Axis 81 degrees    T Axis 39 degrees    Diagnosis       Sinus tachycardiaNonspecific ST abnormalityAbnormal ECGNo previous ECGs available       Screenings   Scott Coma Scale  Eye Opening: Spontaneous  Best Verbal Response: Oriented  Best Motor Response: Obeys commands  [place] Coma Scale Score: 15       MDM and ED Course  Patient is a 62-year-old woman who presents with resolved nausea, vomiting, diarrhea as well as problems with her legs giving out. Normal cerebellar testing. Since she has had this is an acute on chronic issue, I do not believe that this is due to stroke. Patient has had MRI of her brain and spine to evaluate for stroke, lesion, multiple sclerosis which per patient was unremarkable. I do not believe patient would benefit from CT scan at this time given chronicity of these issues. We will give IV fluids. Since she is short of breath and tachycardic, will obtain D-dimer to help risk stratify for pulmonary embolism (EMP MDM)    Patient was reassessed. CT unremarkable. They are feeling well. Objectively they appear to be in no acute distress. Remains tachycardic. This did improve with IV fluids. Do not believe that patient has endocarditis given lack of risk factors. No signs of infection at this time. Discussed with patient need for follow-up. Patient does have debility to return to the hospital if issues arise. Discharge instructions, follow up instructions, and return precautions were discussed with the patient and any available family. All questions were answered. ------------------------    I was called back by the patient's  at around 12:30 PM (after discharge). He states that his wife is doing worse with worsening nausea and weakness. I advised them to return to the emergency room to which he agreed for reevaluation    [unfilled]    Final Impression  1. Nausea    2. Diarrhea, unspecified type    3. Ambulatory dysfunction    4. Dyspnea and respiratory abnormalities        Blood pressure (!) 131/90, pulse 122, temperature 97.6 °F (36.4 °C), temperature source Oral, resp. rate 21, weight 146 lb (66.2 kg), last menstrual period 02/14/2021, SpO2 100 %, not currently breastfeeding.      Disposition:  DISPOSITION Discharge - Pending Orders Complete 03/14/2021 09:29:38 AM      Patient Referrals:  MD Maritza Unger [address] [place] [address]  [phone]    In 1 day        Discharge Medications:  New Prescriptions    No medications on file       Discontinued Medications:  Discontinued Medications    No medications on file       This chart was generated using the [address] [place] [address] [address]  dictation system. I created this record but it may contain dictation errors given the limitations of this technology.         Trenton Haddad MD  03/14/21 Cary Nance MD  03/14/21 Cary Nance MD  03/14/21 5117

## 2021-03-14 NOTE — ED PROVIDER NOTES
[facility]  ED PROVIDER NOTE    Patient Identification  Pt Name: Leonel Shore  MRN: [id number]  [place] 1985  Date of evaluation: 3/14/2021  Provider: Kelli Whitman MD  PCP: Marianela Jasso MD    Chief Complaint  Nausea & Vomiting (nausea, leg weakness)      HPI    History provided by patient     Patient tried to go home and rest however she felt significant nausea and a burning feeling in both legs that ascended. States that she attempted to walk but was unable to. Further history from prior note: This is a 37 y.o. female who presents to the ED for nausea, vomiting, diarrhea, problems with her legs giving out. Also having shortness of breath without chest pain. No fevers or chills. No trauma. Not taking any medications except for a vitamin. She has had at least 20 episodes of her legs giving out over the past 3 years. She is seeing a neurologist for this who performed an MRI of brain as well as her entire spine which did not show any acute abnormalities. States that her strength in her legs is good however they will occasionally give out. She is concerned there may be some sort of electrolyte problem causing this. She does intermittently get rash, nausea, vomiting, shortness of breath. Has seen a rheumatologist for this to suspected dermatomyositis however this was not found. ROS  10 systems reviewed, pertinent positives/negatives per HPI otherwise noted to be negative. I have reviewed the following nursing documentation:  Allergies: Patient has no known allergies.     Past medical history:   Past Medical History:   Diagnosis Date    History of cardiac cath 2015    SVT (supraventricular tachycardia) (HCC)      Past surgical history:   Past Surgical History:   Procedure Laterality Date    ABLATION OF DYSRHYTHMIC FOCUS  2013    RHINOPLASTY      WISDOM TOOTH EXTRACTION         Home medications:   Previous Medications    FLUTICASONE (FLONASE) 50 MCG/ACT SUSP    1 spray by Nasal route daily    GABAPENTIN (NEURONTIN) 100 MG CAPSULE    Take 1 capsule by mouth 3 times daily for 30 days. .    MULTIPLE VITAMIN (MULTI-VITAMIN DAILY PO)    Take  by mouth. NAPROXEN (NAPROSYN) 250 MG TABLET    Take 200 mg by mouth as needed for Pain    ONDANSETRON (ZOFRAN ODT) 4 MG DISINTEGRATING TABLET    Take 1 tablet by mouth every 8 hours as needed for Nausea or Vomiting    OPIUM-BELLADONNA (B&O SUPPRETTES) 16.2-30 MG SUPPOSITORY    Place 30 mg rectally every 8 hours as needed for Pain. .       Social history:  reports that she has never smoked. She has never used smokeless tobacco. She reports current alcohol use. She reports that she does not use drugs. Family history:  History reviewed. No pertinent family history. Exam  ED Triage Vitals [03/14/21 1412]   BP Temp Temp Source Pulse Resp SpO2 Height Weight   121/86 98.6 °F (37 °C) Oral 120 26 97 % -- 146 lb (66.2 kg)     Nursing note and vitals reviewed. Constitutional: In no acute distress  HENT:      Head: Normocephalic      Ears: External ears normal.      Nose: Nose normal.     Mouth: Membrane mucosa moist   Eyes: No discharge. Neck: Supple. Trachea midline. Cardiovascular: Regular rate. Warm extremities  Pulmonary/Chest: Effort normal. No respiratory distress. Abdominal: Soft. No distension. Nontender  : Deferred  Rectal: Deferred   Musculoskeletal: Moves all extremities. No gross deformity. Neurological: Alert and oriented. Face symmetric. Speech is clear. CN 2-12 intact. Skin: Warm and dry. No rash. Psychiatric: Normal mood and affect. Behavior is normal.    Procedures          Radiology  No orders to display       Labs  No results found for this visit on 03/14/21. Screenings           MDM and ED Course  Patient is a 30-year-old woman who presents with nausea, vomiting, diarrhea, bilateral leg weakness. I attempted to ambulate the patient and did see that her legs buckled underneath her.   She was just in this emergency

## 2021-03-14 NOTE — ED NOTES
Patient discharged with all belongings, discharge paperwork and prescriptions x 1 sent to [facility]. Patient verbalized understanding of discharge instructions and follow up. Patient ambulatory out of ED with .      Humera Berg RN  03/14/21 6943

## 2021-03-14 NOTE — PROGRESS NOTES
Physical Therapy    Physical therapy attempted to evaluate this patient. However the patient's RN stated that the patient was leaving the hospital against medical advice.      Eduarda Gonzales PT

## 2021-03-15 LAB
ESTIMATED AVERAGE GLUCOSE: 73.8 MG/DL
HBA1C MFR BLD: 4.2 %

## 2021-09-24 ENCOUNTER — HOSPITAL ENCOUNTER (EMERGENCY)
Age: 36
Discharge: HOME | End: 2021-09-24
Attending: EMERGENCY MEDICINE
Payer: COMMERCIAL

## 2021-09-24 ENCOUNTER — APPOINTMENT (OUTPATIENT)
Dept: GENERAL RADIOLOGY | Age: 36
End: 2021-09-24
Payer: COMMERCIAL

## 2021-09-24 VITALS
DIASTOLIC BLOOD PRESSURE: 84 MMHG | RESPIRATION RATE: 18 BRPM | HEART RATE: 99 BPM | SYSTOLIC BLOOD PRESSURE: 117 MMHG | TEMPERATURE: 97.7 F | WEIGHT: 150 LBS | OXYGEN SATURATION: 99 % | HEIGHT: 64 IN | BODY MASS INDEX: 25.61 KG/M2

## 2021-09-24 DIAGNOSIS — E86.0 DEHYDRATION: ICD-10-CM

## 2021-09-24 DIAGNOSIS — R42 DIZZINESS: Primary | ICD-10-CM

## 2021-09-24 LAB
A/G RATIO: 1.9 (ref 1.1–2.2)
ALBUMIN SERPL-MCNC: 4.4 G/DL (ref 3.4–5)
ALP BLD-CCNC: 47 U/L (ref 40–129)
ALT SERPL-CCNC: 16 U/L (ref 10–40)
ANION GAP SERPL CALCULATED.3IONS-SCNC: 13 MMOL/L (ref 3–16)
AST SERPL-CCNC: 15 U/L (ref 15–37)
BASOPHILS ABSOLUTE: 0 K/UL (ref 0–0.2)
BASOPHILS RELATIVE PERCENT: 0.5 %
BILIRUB SERPL-MCNC: 0.3 MG/DL (ref 0–1)
BILIRUBIN URINE: NEGATIVE
BLOOD, URINE: NEGATIVE
BUN BLDV-MCNC: 10 MG/DL (ref 7–20)
CALCIUM SERPL-MCNC: 9.1 MG/DL (ref 8.3–10.6)
CHLORIDE BLD-SCNC: 106 MMOL/L (ref 99–110)
CLARITY: CLEAR
CO2: 20 MMOL/L (ref 21–32)
COLOR: YELLOW
CREAT SERPL-MCNC: 0.8 MG/DL (ref 0.6–1.1)
EKG ATRIAL RATE: 98 BPM
EKG DIAGNOSIS: NORMAL
EKG P AXIS: 69 DEGREES
EKG P-R INTERVAL: 132 MS
EKG Q-T INTERVAL: 362 MS
EKG QRS DURATION: 88 MS
EKG QTC CALCULATION (BAZETT): 462 MS
EKG R AXIS: 69 DEGREES
EKG T AXIS: 44 DEGREES
EKG VENTRICULAR RATE: 98 BPM
EOSINOPHILS ABSOLUTE: 0.1 K/UL (ref 0–0.6)
EOSINOPHILS RELATIVE PERCENT: 1.7 %
GFR AFRICAN AMERICAN: >60
GFR NON-AFRICAN AMERICAN: >60
GLOBULIN: 2.3 G/DL
GLUCOSE BLD-MCNC: 202 MG/DL (ref 70–99)
GLUCOSE URINE: NEGATIVE MG/DL
HCG QUALITATIVE: NEGATIVE
HCT VFR BLD CALC: 36.8 % (ref 36–48)
HEMOGLOBIN: 12.6 G/DL (ref 12–16)
KETONES, URINE: NEGATIVE MG/DL
LEUKOCYTE ESTERASE, URINE: NEGATIVE
LIPASE: 21 U/L (ref 13–60)
LYMPHOCYTES ABSOLUTE: 4 K/UL (ref 1–5.1)
LYMPHOCYTES RELATIVE PERCENT: 52.6 %
MCH RBC QN AUTO: 32.1 PG (ref 26–34)
MCHC RBC AUTO-ENTMCNC: 34.3 G/DL (ref 31–36)
MCV RBC AUTO: 93.7 FL (ref 80–100)
MICROSCOPIC EXAMINATION: NORMAL
MONOCYTES ABSOLUTE: 0.6 K/UL (ref 0–1.3)
MONOCYTES RELATIVE PERCENT: 7.9 %
NEUTROPHILS ABSOLUTE: 2.8 K/UL (ref 1.7–7.7)
NEUTROPHILS RELATIVE PERCENT: 37.3 %
NITRITE, URINE: NEGATIVE
PDW BLD-RTO: 12.6 % (ref 12.4–15.4)
PH UA: 5.5 (ref 5–8)
PLATELET # BLD: 512 K/UL (ref 135–450)
PMV BLD AUTO: 8.2 FL (ref 5–10.5)
POTASSIUM SERPL-SCNC: 3.9 MMOL/L (ref 3.5–5.1)
PROTEIN UA: NEGATIVE MG/DL
RBC # BLD: 3.93 M/UL (ref 4–5.2)
SODIUM BLD-SCNC: 139 MMOL/L (ref 136–145)
SPECIFIC GRAVITY UA: 1.02 (ref 1–1.03)
TOTAL PROTEIN: 6.7 G/DL (ref 6.4–8.2)
TROPONIN: <0.01 NG/ML
URINE TYPE: NORMAL
UROBILINOGEN, URINE: 0.2 E.U./DL
WBC # BLD: 7.6 K/UL (ref 4–11)

## 2021-09-24 PROCEDURE — 99283 EMERGENCY DEPT VISIT LOW MDM: CPT

## 2021-09-24 PROCEDURE — 85025 COMPLETE CBC W/AUTO DIFF WBC: CPT

## 2021-09-24 PROCEDURE — 83690 ASSAY OF LIPASE: CPT

## 2021-09-24 PROCEDURE — 71045 X-RAY EXAM CHEST 1 VIEW: CPT

## 2021-09-24 PROCEDURE — 84703 CHORIONIC GONADOTROPIN ASSAY: CPT

## 2021-09-24 PROCEDURE — 36415 COLL VENOUS BLD VENIPUNCTURE: CPT

## 2021-09-24 PROCEDURE — 96374 THER/PROPH/DIAG INJ IV PUSH: CPT

## 2021-09-24 PROCEDURE — 96361 HYDRATE IV INFUSION ADD-ON: CPT

## 2021-09-24 PROCEDURE — 6360000002 HC RX W HCPCS: Performed by: EMERGENCY MEDICINE

## 2021-09-24 PROCEDURE — 80053 COMPREHEN METABOLIC PANEL: CPT

## 2021-09-24 PROCEDURE — 93010 ELECTROCARDIOGRAM REPORT: CPT | Performed by: INTERNAL MEDICINE

## 2021-09-24 PROCEDURE — 84484 ASSAY OF TROPONIN QUANT: CPT

## 2021-09-24 PROCEDURE — 93005 ELECTROCARDIOGRAM TRACING: CPT | Performed by: EMERGENCY MEDICINE

## 2021-09-24 PROCEDURE — 2580000003 HC RX 258: Performed by: EMERGENCY MEDICINE

## 2021-09-24 PROCEDURE — 81003 URINALYSIS AUTO W/O SCOPE: CPT

## 2021-09-24 RX ORDER — 0.9 % SODIUM CHLORIDE 0.9 %
1000 INTRAVENOUS SOLUTION INTRAVENOUS ONCE
Status: COMPLETED | OUTPATIENT
Start: 2021-09-24 | End: 2021-09-24

## 2021-09-24 RX ORDER — ONDANSETRON 2 MG/ML
4 INJECTION INTRAMUSCULAR; INTRAVENOUS ONCE
Status: COMPLETED | OUTPATIENT
Start: 2021-09-24 | End: 2021-09-24

## 2021-09-24 RX ADMIN — ONDANSETRON 4 MG: 2 INJECTION INTRAMUSCULAR; INTRAVENOUS at 06:23

## 2021-09-24 RX ADMIN — SODIUM CHLORIDE 1000 ML: 9 INJECTION, SOLUTION INTRAVENOUS at 06:21

## 2021-09-24 NOTE — ED PROVIDER NOTES
[address] [facility]  ED  EMERGENCY DEPARTMENT ENCOUNTER      Pt Name: Bubba Zhou  MRN: [id number]  Le[place] 1985  Date of evaluation: 9/24/2021  Provider: Kaylene Eckert MD    [address] [place] [address]       Chief Complaint   Patient presents with    Dizziness     pt woke up at 0200 with theses Sx     Irregular Heart Beat     Hx of SVT had an ablasion 2013 at [address]   (Location/Symptom, Timing/Onset, Context/Setting, Quality, Duration, Modifying Factors, Severity)  Note limiting factors. Bubba Zhou is a 31 y.o. female with past medical history of SVT status post catheter ablation here today with dizziness. Patient states she woke up approximately 4 AM was feeling lightheaded and dizzy. States she was very thirsty and has been drinking Powerade but still feels thirsty. She states it feels like her heart was racing but also notes she was feeling off balance. Denies any significant headache. No numbness, tingling or weakness in her extremities. No chest pain. Mild shortness of breath without cough. No fevers or chills. Denies diarrhea or abdominal pain but did have some nausea and vomiting just prior to arrival.  She has no dysuria or hematuria but does have urinary frequency. Denies lower extremity swelling or redness. No recent prolonged immobility. She is not on birth control. [facility] [place]    Nursing Notes were reviewed. REVIEW OF SYSTEMS    (2-9 systems for level 4, 10 or more for level 5)     Review of Systems    Please see HPI for pertinent positive and negative review of system findings. A full 10 system ROS was performed and otherwise negative.         PAST MEDICAL HISTORY     Past Medical History:   Diagnosis Date    History of cardiac cath 2015    SVT (supraventricular tachycardia) ([address]          SURGICAL HISTORY       Past Surgical History:   Procedure Laterality Date    ABLATION OF DYSRHYTHMIC FOCUS  2013    RHINOPLASTY      KEVON TOOTH EXTRACTION           CURRENT MEDICATIONS       Previous Medications    FLUTICASONE (FLONASE) 50 MCG/ACT SUSP    1 spray by Nasal route daily    GABAPENTIN (NEURONTIN) 100 MG CAPSULE    Take 1 capsule by mouth 3 times daily for 30 days. .    MULTIPLE VITAMIN (MULTI-VITAMIN DAILY PO)    Take  by mouth. NAPROXEN (NAPROSYN) 250 MG TABLET    Take 200 mg by mouth as needed for Pain    ONDANSETRON (ZOFRAN ODT) 4 MG DISINTEGRATING TABLET    Take 1 tablet by mouth every 8 hours as needed for Nausea or Vomiting    OPIUM-BELLADONNA (B&O SUPPRETTES) 16.2-30 MG SUPPOSITORY    Place 30 mg rectally every 8 hours as needed for Pain. Prasad Wen ALLERGIES     Patient has no known allergies. FAMILY HISTORY     No family history on file. SOCIAL HISTORY       Social History     Socioeconomic History    Marital status:      Spouse name: Not on file    Number of children: Not on file    Years of education: Not on file    Highest education level: Not on file   Occupational History    Not on file   Tobacco Use    Smoking status: Never Smoker    Smokeless tobacco: Never Used   Substance and Sexual Activity    Alcohol use: Yes     Comment: rarely    Drug use: No    Sexual activity: Yes     Partners: Male   Other Topics Concern    Not on file   Social History Narrative    Not on file     Social Determinants of Health     Financial Resource Strain:     Difficulty of Paying Living Expenses:    Food Insecurity:     Worried About Running Out of Food in the Last Year:     [address] in the Last Year:    Transportation Needs:     Lack of Transportation (Medical):      Lack of Transportation (Non-Medical):    Physical Activity:     Days of Exercise per Week:     Minutes of Exercise per Session:    Stress:     Feeling of Stress :    Social Connections:     Frequency of Communication with Friends and Family:     Frequency of Social Gatherings with Friends and Family:     Attends Bahai Services:     Active Member of Clubs or Organizations:     Attends Club or Organization Meetings:     Marital Status:    Intimate Partner Violence:     Fear of Current or Ex-Partner:     Emotionally Abused:     Physically Abused:     Sexually Abused:        SCREENINGS               PHYSICAL EXAM    (up to 7 for level 4, 8 or more for level 5)     ED Triage Vitals   BP Temp Temp Source Pulse Resp SpO2 Height Weight   09/24/21 0501 09/24/21 0501 09/24/21 0501 09/24/21 0500 09/24/21 0500 09/24/21 0501 09/24/21 0458 09/24/21 0458   133/88 97.7 °F (36.5 °C) Oral 96 24 99 % 5' 4\" (1.626 m) 150 lb (68 kg)       Physical Exam    General appearance:  Cooperative. Mildly anxious but overall in no acute distress. Skin:  Warm. Dry. Eye:  Extraocular movements intact. Ears, nose, mouth and throat:  Oral mucosa moist,  Neck:  Trachea midline. Heart: Tachycardic but regular  Perfusion:  intact  Respiratory:  Lungs clear to auscultation bilaterally. Respirations nonlabored. Abdominal:   Non distended. Nontender  Neurological:  Alert and oriented x 3.   Moves all extremities spontaneously  Musculoskeletal:   Normal ROM, no deformities          Psychiatric:  Normal mood      DIAGNOSTIC RESULTS       Labs Reviewed   CBC WITH AUTO DIFFERENTIAL - Abnormal; Notable for the following components:       Result Value    RBC 3.93 (*)     Platelets 884 (*)     All other components within normal limits    Narrative:     Performed at:  [place] [id number],  [address], [address]   Phone [phone]   COMPREHENSIVE METABOLIC PANEL - Abnormal; Notable for the following components:    CO2 20 (*)     Glucose 202 (*)     All other components within normal limits    Narrative:     Performed at:  [place] [facility]  [address],  [address], [address]   Phone [phone]   URINALYSIS    Narrative:     Performed at:  [address] [place] [address]  [place],  [place], [employer]   Phone [phone]   HCG, SERUM, QUALITATIVE    Narrative:     Performed at:  [facility]) - [address], [employer]   Phone [phone]   LIPASE    Narrative:     Performed at:  [facility]) - [address], [facility] [employer]   Phone [phone]   TROPONIN    Narrative:     Performed at:  [facility]) - [address], [employer]   Phone [phone]       Interpretation per the Radiologist below, if obtained/available at the time of this note:    XR CHEST PORTABLE   Final Result   Negative portable chest.             All other labs/imaging were within normal range or not returned as of this dictation. EMERGENCY DEPARTMENT COURSE and DIFFERENTIAL DIAGNOSIS/MDM:   Vitals:    Vitals:    09/24/21 0500 09/24/21 0501 09/24/21 0615 09/24/21 0723   BP:  133/88 122/82 117/84   Pulse: 96  116 99   Resp: 24  20 18   Temp:  97.7 °F (36.5 °C)     TempSrc:  Oral     SpO2:  99% 100% 99%   Weight:       Height:           EKG: Normal sinus rhythm rate of 98 bpm.  No ST elevation. Questionable ST segment changes inferiorly. No prior. Patient presents emergency department today complaining of dizziness palpitations excessive thirst.  Presented here with mild tachycardia. No risk factors for DVT/PE. Vital signs otherwise stable. No oxygen requirement. Unremarkable physical exam otherwise. Laboratory studies note slightly elevated glucose level of 200. While not overtly diagnostic of diabetes with a nonfasting test, certainly concerning for possible underlying diabetes. Troponin was negative. EKG unremarkable. Chest x-ray clear. Patient was given IV fluids and tachycardia completely resolved. Suspect some mild dehydration.   Will encourage her to monitor her sugars at home and follow-up with her primary care physician but otherwise feel she may be